# Patient Record
Sex: MALE | Race: WHITE
[De-identification: names, ages, dates, MRNs, and addresses within clinical notes are randomized per-mention and may not be internally consistent; named-entity substitution may affect disease eponyms.]

---

## 2018-02-04 ENCOUNTER — HOSPITAL ENCOUNTER (EMERGENCY)
Dept: HOSPITAL 76 - ED | Age: 38
Discharge: HOME | End: 2018-02-04
Payer: COMMERCIAL

## 2018-02-04 VITALS — DIASTOLIC BLOOD PRESSURE: 102 MMHG | SYSTOLIC BLOOD PRESSURE: 141 MMHG
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
## 2018-02-04 DIAGNOSIS — T17.228A: Primary | ICD-10-CM

## 2018-02-04 PROCEDURE — 99282 EMERGENCY DEPT VISIT SF MDM: CPT

## 2018-02-04 NOTE — ED PHYSICIAN DOCUMENTATION
History of Present Illness





- Stated complaint


Stated Complaint: FISH BONE IN THROAT





- Chief complaint


Chief Complaint: General





- History obtained from


History obtained from: Patient





- History of Present Illness


Timing: Enter  time (0430), Today





- Additonal information


Additional information: 





37-year-old previously healthy male was at work this morning when he ate some 

salmon and felt that a bone became lodged in his right throat.  He denies any 

choking denies any difficulty with breathing he denies any suffocation.  He 

does have a foreign body sensation when he swallows and he feels that it is 

just inside his throat on the right side.





Review of Systems


Constitutional: denies: Fever, Myalgias


Eyes: denies: Decreased vision


Ears: denies: Ear pain


Nose: denies: Rhinorrhea / runny nose, Congestion


Throat: reports: Sore throat


Cardiac: denies: Chest pain / pressure


Respiratory: denies: Dyspnea, Cough


GI: denies: Vomiting, Diarrhea





PD PAST MEDICAL HISTORY





- Past Medical History


Past Medical History: No





- Social History


Does the pt smoke?: No


Smoking Status: Never smoker





PD ED PE NORMAL





- Vitals


Vital signs reviewed: Yes (Hypertensive)





- General


General: Alert and oriented X 3, No acute distress, Well developed/nourished





- HEENT


HEENT: Atraumatic, PERRL, EOMI, Moist mucous membranes, Pharynx benign, 

Dentition benign, Other (I am able to see the right tonsil in its entirety and 

I am unable to see an obvious foreign body.  There is no specific inflammation 

to the area.  There is no foreign body on the tongue the tonsillar pillar or 

the soft palate.)





- Neck


Neck: Supple, no meningeal sign, No bony TTP





- Cardiac


Cardiac: RRR, No murmur





- Respiratory


Respiratory: No respiratory distress, Clear bilaterally





- Neuro


Neuro: Alert and oriented X 3, No motor deficit, No sensory deficit, Normal 

speech


Eye Opening: Spontaneous


Motor: Obeys Commands


Verbal: Oriented


GCS Score: 15





- Psych


Psych: Normal mood, Normal affect





Results





- Vitals


Vitals: 





 Vital Signs - 24 hr











  02/04/18





  08:16


 


Temperature 36.1 C L


 


Heart Rate 63


 


Respiratory 18





Rate 


 


Blood Pressure 141/102 H


 


O2 Saturation 100








 Oxygen











O2 Source                      Room air

















PD MEDICAL DECISION MAKING





- ED course


Complexity details: considered differential, d/w patient, d/w family


ED course: 





37-year-old male with a foreign body sensation in his throat has not had any 

choking or trouble breathing associated with this and I am unable to see an 

obvious foreign body.  He feels this may be a scrape somewhere and he is 

comfortable with evaluation here this morning.We have discussed persistence of 

symptoms development of a foreign body reaction and follow-up required for this 

being through the ENT which we will provide a telephone number for. 





Departure





- Departure


Disposition: 01 Home, Self Care


Clinical Impression: 


Pharyngeal foreign body


Qualifiers:


 Encounter type: initial encounter Qualified Code(s): T17.208A - Unspecified 

foreign body in pharynx causing other injury, initial encounter





Instructions:  ED Foreign Body Swallowed Adult


Follow-Up: 


[facility] [place] [Provider Group]


[facility] [Provider Group]


Comments: 


Today in the Emergency Department your blood pressure was elevated. This can 

happen from the stress of the visit itself, from a current illness or 

circumstance or from uncontrolled hypertension. If you take blood pressure 

medications take your usual mediations, have your blood pressure re-checked in 

an appropriate setting and follow up any  elevation with your primary care 

doctor.

## 2018-07-29 ENCOUNTER — HOSPITAL ENCOUNTER (EMERGENCY)
Dept: HOSPITAL 76 - ED | Age: 38
Discharge: HOME | End: 2018-07-29
Payer: COMMERCIAL

## 2018-07-29 VITALS — SYSTOLIC BLOOD PRESSURE: 128 MMHG | DIASTOLIC BLOOD PRESSURE: 91 MMHG

## 2018-07-29 DIAGNOSIS — J02.8: Primary | ICD-10-CM

## 2018-07-29 DIAGNOSIS — B97.89: ICD-10-CM

## 2018-07-29 PROCEDURE — 87430 STREP A AG IA: CPT

## 2018-07-29 PROCEDURE — 87070 CULTURE OTHR SPECIMN AEROBIC: CPT

## 2018-07-29 PROCEDURE — 99283 EMERGENCY DEPT VISIT LOW MDM: CPT

## 2018-07-29 NOTE — ED PHYSICIAN DOCUMENTATION
History of Present Illness





- Stated complaint


Stated Complaint: SORE THROAT





- Chief complaint


Chief Complaint: General





- History obtained from


History obtained from: Patient, Family





- History of Present Illness


Timing: Yesterday


Pain level max: 5


Pain level now: 4


Improved by: nothing


Worsened by: swallowing





- Additonal information


Additional information: 





states sore throat since yesterday. No fever. mild rhinorrhea and congestion. 

No cough. states hurts more to swallow. 





Review of Systems


Constitutional: denies: Fever, Chills


Respiratory: denies: Cough


GI: denies: Abdominal Pain, Nausea, Vomiting, Diarrhea


Skin: denies: Rash





PD PAST MEDICAL HISTORY





- Past Medical History


Past Medical History: No





- Past Surgical History


Past Surgical History: No





- Present Medications


Home Medications: 


 Ambulatory Orders











 Medication  Instructions  Recorded  Confirmed


 


Ibuprofen [Motrin] 800 mg PO Q8H PRN #30 tablet 07/29/18 














- Allergies


Allergies/Adverse Reactions: 


 Allergies











Allergy/AdvReac Type Severity Reaction Status Date / Time


 


Penicillins Allergy  Unknown Verified 07/29/18 10:09














- Social History


Does the pt smoke?: No


Smoking Status: Never smoker


Does the pt drink ETOH?: Yes


ETOH Use: Wine


Does the pt have substance abuse?: No





- Immunizations


Immunizations are current?: Yes





PD ED PE NORMAL





- Vitals


Vital signs reviewed: Yes





- General


General: Alert and oriented X 3, No acute distress, Well developed/nourished





- HEENT


HEENT: Ears normal, Moist mucous membranes, Other (Mild posterior pharyngeal 

erythema without tonsillar exudates.  Uvula midline.  Normal phonation.  No 

trismus)





- Neck


Neck: Supple, no meningeal sign, No adenopathy





- Cardiac


Cardiac: RRR, No murmur





- Respiratory


Respiratory: No respiratory distress, Clear bilaterally





- Abdomen


Abdomen: Soft, Non tender, Non distended, No organomegaly





- Back


Back: No CVA TTP





- Derm


Derm: Warm and dry, No rash





- Extremities


Extremities: No edema





- Neuro


Neuro: Alert and oriented X 3





Results





- Vitals


Vitals: 


 Vital Signs - 24 hr











  07/29/18 07/29/18





  10:08 11:38


 


Temperature 36.5 C 36.4 C L


 


Heart Rate 78 81


 


Respiratory 20 18





Rate  


 


Blood Pressure 140/86 H 128/91 H


 


O2 Saturation 96 96








 Oxygen











O2 Source                      Room air

















- Labs


Labs: 


 Laboratory Tests











  07/29/18





  10:10


 


Group A Strep Rapid  Negative














PD MEDICAL DECISION MAKING





- ED course


Complexity details: considered differential, d/w patient


ED course: 





Patient is a 37-year-old male with what appears to be a viral pharyngitis.  He 

is well-appearing, nontoxic.  Afebrile.  Rapid strep is negative.  Given 

dexamethasone here.  Will continue supportive care and follow-up with his 

doctor.  Patient counseled regarding signs and symptoms for which I believe and 

urgent re-evaluation would be necessary. Patient with good understanding of and 

agreement to plan and is comfortable going home at this time





This document was made in part using voice recognition software. While efforts 

are made to proofread this document, sound alike and grammatical errors may 

occur.





- Sepsis Event


Vital Signs: 


 Vital Signs - 24 hr











  07/29/18 07/29/18





  10:08 11:38


 


Temperature 36.5 C 36.4 C L


 


Heart Rate 78 81


 


Respiratory 20 18





Rate  


 


Blood Pressure 140/86 H 128/91 H


 


O2 Saturation 96 96








 Oxygen











O2 Source                      Room air

















Departure





- Departure


Disposition: 01 Home, Self Care


Clinical Impression: 


 Viral pharyngitis





Condition: Good


Instructions:  ED Pharyngitis Viral


Follow-Up: 


your,doctor in 1 week [Other]


Prescriptions: 


Ibuprofen [Motrin] 800 mg PO Q8H PRN #30 tablet


 PRN Reason: PAIN &/OR FEVER


Comments: 


Your rapid strep is negative today.  Return if you worsen.  Drink plenty of 

fluids and rest.  A backup throat culture was also sent, if this is positive we 

will call you.


Discharge Date/Time: 07/29/18 11:36